# Patient Record
Sex: FEMALE | Race: WHITE | NOT HISPANIC OR LATINO | Employment: FULL TIME | URBAN - METROPOLITAN AREA
[De-identification: names, ages, dates, MRNs, and addresses within clinical notes are randomized per-mention and may not be internally consistent; named-entity substitution may affect disease eponyms.]

---

## 2018-08-01 ENCOUNTER — OFFICE VISIT (OUTPATIENT)
Dept: URGENT CARE | Facility: CLINIC | Age: 53
End: 2018-08-01
Payer: COMMERCIAL

## 2018-08-01 ENCOUNTER — APPOINTMENT (OUTPATIENT)
Dept: RADIOLOGY | Facility: CLINIC | Age: 53
End: 2018-08-01
Attending: EMERGENCY MEDICINE
Payer: COMMERCIAL

## 2018-08-01 VITALS
TEMPERATURE: 99 F | HEART RATE: 100 BPM | RESPIRATION RATE: 22 BRPM | SYSTOLIC BLOOD PRESSURE: 142 MMHG | HEIGHT: 68 IN | BODY MASS INDEX: 21.22 KG/M2 | WEIGHT: 140 LBS | OXYGEN SATURATION: 96 % | DIASTOLIC BLOOD PRESSURE: 100 MMHG
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
## 2018-08-01 DIAGNOSIS — S40.012A CONTUSION OF LEFT SHOULDER, INITIAL ENCOUNTER: ICD-10-CM
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
## 2018-08-01 PROCEDURE — 73502 X-RAY EXAM HIP UNI 2-3 VIEWS: CPT

## 2018-08-01 PROCEDURE — G0382 LEV 3 HOSP TYPE B ED VISIT: HCPCS | Performed by: EMERGENCY MEDICINE

## 2018-08-01 PROCEDURE — 73030 X-RAY EXAM OF SHOULDER: CPT

## 2018-08-01 RX ORDER — DICLOFENAC SODIUM 75 MG/1
75 TABLET, DELAYED RELEASE ORAL 2 TIMES DAILY
Qty: 14 TABLET | Refills: 0 | Status: SHIPPED | OUTPATIENT
Start: 2018-08-01 | End: 2018-09-26 | Stop reason: DRUGHIGH

## 2018-08-01 NOTE — PATIENT INSTRUCTIONS
Rest at home  Follow up with [address] if your symptoms are not improving  Return any time if problems
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
## 2018-08-05 ENCOUNTER — OFFICE VISIT (OUTPATIENT)
Dept: URGENT CARE | Facility: CLINIC | Age: 53
End: 2018-08-05
Payer: COMMERCIAL

## 2018-08-05 ENCOUNTER — APPOINTMENT (OUTPATIENT)
Dept: RADIOLOGY | Facility: CLINIC | Age: 53
End: 2018-08-05
Payer: COMMERCIAL

## 2018-08-05 VITALS
RESPIRATION RATE: 16 BRPM | WEIGHT: 140 LBS | HEART RATE: 91 BPM | TEMPERATURE: 98.6 F | BODY MASS INDEX: 21.22 KG/M2 | OXYGEN SATURATION: 97 % | SYSTOLIC BLOOD PRESSURE: 110 MMHG | DIASTOLIC BLOOD PRESSURE: 80 MMHG | HEIGHT: 68 IN
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
## 2018-08-05 PROCEDURE — 73080 X-RAY EXAM OF ELBOW: CPT

## 2018-08-05 PROCEDURE — G0382 LEV 3 HOSP TYPE B ED VISIT: HCPCS | Performed by: PHYSICIAN ASSISTANT

## 2018-08-05 RX ORDER — IBUPROFEN 800 MG/1
800 TABLET ORAL EVERY 8 HOURS PRN
Qty: 20 TABLET | Refills: 0 | Status: SHIPPED | OUTPATIENT
Start: 2018-08-05
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
## 2018-08-05 NOTE — LETTER
August 5, 2018     Patient: Yadiel Waters   YOB: 1965   Date of Visit: 8/5/2018       To Whom it May Concern:    Alysa Echeverria was seen in my clinic on 8/5/2018  If you have any questions or concerns, please don't hesitate to call           Sincerely,          QU Lorenzo Ferraro        CC: No Recipients

## 2018-08-05 NOTE — PATIENT INSTRUCTIONS
Take ibuprofen as directed  Apply ice to the elbow and wrist  Begin to move elbow and wrist as keeping them still might add to pain due to stiffness  Follow-up with PCP/ortho if no improvement in 4-5 days  If symptoms worsen follow-up sooner

## 2018-09-26 ENCOUNTER — APPOINTMENT (OUTPATIENT)
Dept: RADIOLOGY | Facility: CLINIC | Age: 53
End: 2018-09-26
Payer: COMMERCIAL

## 2018-09-26 ENCOUNTER — OFFICE VISIT (OUTPATIENT)
Dept: PHYSICAL THERAPY | Facility: CLINIC | Age: 53
End: 2018-09-26
Payer: COMMERCIAL

## 2018-09-26 ENCOUNTER — OFFICE VISIT (OUTPATIENT)
Dept: OBGYN CLINIC | Facility: CLINIC | Age: 53
End: 2018-09-26
Payer: COMMERCIAL

## 2018-09-26 VITALS
HEART RATE: 84 BPM | DIASTOLIC BLOOD PRESSURE: 92 MMHG | BODY MASS INDEX: 20.92 KG/M2 | WEIGHT: 138 LBS | HEIGHT: 68 IN | SYSTOLIC BLOOD PRESSURE: 130 MMHG

## 2018-09-26 DIAGNOSIS — M70.62 GREATER TROCHANTERIC BURSITIS OF LEFT HIP: Primary | ICD-10-CM

## 2018-09-26 DIAGNOSIS — S70.02XA CONTUSION OF LEFT HIP, INITIAL ENCOUNTER: ICD-10-CM

## 2018-09-26 DIAGNOSIS — M65.4 DE QUERVAIN'S SYNDROME (TENOSYNOVITIS): ICD-10-CM

## 2018-09-26 DIAGNOSIS — M18.12 ARTHRITIS OF CARPOMETACARPAL (CMC) JOINT OF LEFT THUMB: Primary | ICD-10-CM

## 2018-09-26 DIAGNOSIS — M25.532 PAIN IN LEFT WRIST: ICD-10-CM

## 2018-09-26 DIAGNOSIS — M18.12 ARTHRITIS OF CARPOMETACARPAL (CMC) JOINT OF LEFT THUMB: ICD-10-CM

## 2018-09-26 PROCEDURE — G8985 CARRY GOAL STATUS: HCPCS

## 2018-09-26 PROCEDURE — L3808 WHFO, RIGID W/O JOINTS: HCPCS

## 2018-09-26 PROCEDURE — 73100 X-RAY EXAM OF WRIST: CPT

## 2018-09-26 PROCEDURE — 99204 OFFICE O/P NEW MOD 45 MIN: CPT | Performed by: ORTHOPAEDIC SURGERY

## 2018-09-26 PROCEDURE — G8984 CARRY CURRENT STATUS: HCPCS

## 2018-09-26 PROCEDURE — G8986 CARRY D/C STATUS: HCPCS

## 2018-09-26 RX ORDER — TRAMADOL HYDROCHLORIDE 50 MG/1
50 TABLET ORAL
Qty: 30 TABLET | Refills: 0 | Status: SHIPPED | OUTPATIENT
Start: 2018-09-26 | End: 2018-09-26 | Stop reason: CLARIF

## 2018-09-26 RX ORDER — TRAMADOL HYDROCHLORIDE 50 MG/1
50 TABLET ORAL
Qty: 30 TABLET | Refills: 0 | Status: SHIPPED | OUTPATIENT
Start: 2018-09-26

## 2018-09-26 NOTE — PROGRESS NOTES
Daily Note     Today's date: 2018  Patient name: Felecia Stewart  : 1965  MRN: [id number]  Referring provider: Norris Petit MD  Dx:   Encounter Diagnosis     ICD-10-CM    1  Arthritis of carpometacarpal [place]) joint of left thumb M18 12 Ambulatory referral to Occupational Therapy   2  De Quervain's syndrome (tenosynovitis) M65 4 Ambulatory referral to Occupational Therapy           Splint     Indication: L thumb CMC DJD, DeQuervain's    Location: Left  wrist, hand and thumb  Supplies: Orthotics  Thermoplastic material    Splint type: Forearm based radial gutter thumb spica  Wearing Schedule: Wearing during activities, during sleep  Describe Position: Thumb in functional abd position    Precautions: tenderness to [address] [place] [address]    Patient or Caregiver expresses understanding of wearing Schedule and Precautions? Yes  Patient or Caregiver able to don/doff orthotic independently? Yes    Written orders provided to patient?  yes  Orders Obtained: Verbal  Orders Obtained from: Dr Milady Blackburn
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
